# Patient Record
Sex: FEMALE | Race: BLACK OR AFRICAN AMERICAN | NOT HISPANIC OR LATINO
[De-identification: names, ages, dates, MRNs, and addresses within clinical notes are randomized per-mention and may not be internally consistent; named-entity substitution may affect disease eponyms.]

---

## 2018-01-15 ENCOUNTER — RX ONLY (OUTPATIENT)
Age: 43
Setting detail: RX ONLY
End: 2018-01-15

## 2018-02-20 ENCOUNTER — HOSPITAL ENCOUNTER (EMERGENCY)
Dept: HOSPITAL 5 - ED | Age: 43
Discharge: HOME | End: 2018-02-20
Payer: COMMERCIAL

## 2018-02-20 DIAGNOSIS — K21.9: ICD-10-CM

## 2018-02-20 DIAGNOSIS — I10: ICD-10-CM

## 2018-02-20 DIAGNOSIS — M25.571: Primary | ICD-10-CM

## 2018-02-20 DIAGNOSIS — I48.91: ICD-10-CM

## 2018-02-20 PROCEDURE — 96372 THER/PROPH/DIAG INJ SC/IM: CPT

## 2018-02-20 PROCEDURE — 99283 EMERGENCY DEPT VISIT LOW MDM: CPT

## 2018-02-20 PROCEDURE — 36415 COLL VENOUS BLD VENIPUNCTURE: CPT

## 2018-02-20 PROCEDURE — 85379 FIBRIN DEGRADATION QUANT: CPT

## 2018-02-20 PROCEDURE — 73610 X-RAY EXAM OF ANKLE: CPT

## 2018-02-20 NOTE — EMERGENCY DEPARTMENT REPORT
ED Lower Extremity HPI





- General


Chief Complaint: Extremity Injury, Lower


Stated Complaint: BILATERAL LEGS/FEET SWELLING


Time Seen by Provider: 18 19:05


Source: patient, family


Mode of arrival: Ambulatory


Limitations: No Limitations





- History of Present Illness


Initial Comments: 





Patient airport right ankle pain.  She denies injury to said last Tuesday which 

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 

classroom sugar ankle he came more swollen and painful but did not have any 

injury.  She is having pain to her right ankle 8 out of 10 and aching.  Patient 

also has a history of swelling to both legs and reporting pain is radiating up 

in her right leg and she has swelling to both her legs.  Patient has a history 

of atrial fibrillation in the past and also that she had a history of heart 

attack.  She said she has a history of uterine ablation for dysfunctional 

uterine bleeding.  Patient has a history of GERD.  She also reports that she 

was on Lasix in the past as noted on the chart that on 2016 she was 

placed on Lasix for edema in her legs.  Patient says she's been out of her 

metoprolol which she takes twice a day after she had atrial fibrillation and 

heart attack.  Her blood pressure is stable at present.  She denies any chest 

pain, shortness of breath.  Denies any birth control.  Denies any recent long 

distance travel by plane or car to exceed 3 or more hours.  Denies any history 

of cancer or recent convalescent.  She reports that pain is radiating up in her 

right calf and it feels sore but aching into her right ankle.  Over-the-counter 

medication does not help pain.  Pain is worse with weightbearing and better 

with resting.  Denies any fever or chills.  Denies any injuries to her 

extremities.  Denies any redness to legs.  Patient has a history of chronic 

edema to both her lower extremity to include her ankles and feet.


MD Complaint: other


Onset/Timin


-: week(s)


Injury: Ankle: Right (Pain and sweswelling rt ankle)


Type of Injury: unknown


Place: home


Severity: severe


Severity scale (0 -10): 7


Improves With: immobilization, rest


Worsens With: weight bearing, movement, palpation


Context: other (unknown)


Associated Symptoms: swelling, ambulatory.  denies: snap/pop sensation, numbness

, tingling, unable to bear weight, able to partially bear weight





- Related Data


 Previous Rx's











 Medication  Instructions  Recorded  Last Taken  Type


 


ALBUTEROL Inhaler [Proair] 2 puff IH QID PRN #1 inhalation 16 Unknown Rx


 


Furosemide [Lasix] 20 mg PO QDAY #10 tablet 16 Unknown Rx


 


Ibuprofen [Motrin] 800 mg PO Q8HR PRN #12 tablet 18 Unknown Rx


 


Metoprolol [Lopressor TAB] 50 mg PO Q12H 30 Days #60 tablet 18 Unknown Rx











 Allergies











Allergy/AdvReac Type Severity Reaction Status Date / Time


 


No Known Allergies Allergy   Verified 01/01/15 22:03














ED Review of Systems


ROS: 


Stated complaint: BILATERAL LEGS/FEET SWELLING


Other details as noted in HPI





Comment: All other systems reviewed and negative


Constitutional: no symptoms reported


Respiratory: no symptoms reported


Cardiovascular: denies: chest pain, palpitations, dyspnea on exertion, edema, 

syncope, paroxysmal nocturnal dyspnea


Gastrointestinal: denies: abdominal pain, nausea, vomiting


Genitourinary: denies: urgency, dysuria, frequency, hematuria, discharge, 

abnormal menses, dyspareunia


Musculoskeletal: joint swelling, arthralgia.  denies: back pain, myalgia


Neurological: denies: headache, weakness, numbness, paresthesias, confusion, 

abnormal gait, vertigo





ED Past Medical Hx





- Past Medical History


Previous Medical History?: Yes


Hx Hypertension: Yes


Hx GERD: Yes


Hx Renal Disease: No


Hx Asthma: No


Additional medical history: A. Fib.





- Surgical History


Past Surgical History?: Yes


Additional Surgical History: Uterine ablation for DUB 2014





- Family History


Family history: hypertension





- Social History


Smoking Status: Never Smoker


Substance Use Type: Alcohol





- Medications


Home Medications: 


 Home Medications











 Medication  Instructions  Recorded  Confirmed  Last Taken  Type


 


ALBUTEROL Inhaler [Proair] 2 puff IH QID PRN #1 inhalation 16  Unknown Rx


 


Furosemide [Lasix] 20 mg PO QDAY #10 tablet 16  Unknown Rx


 


Ibuprofen [Motrin] 800 mg PO Q8HR PRN #12 tablet 18  Unknown Rx


 


Metoprolol [Lopressor TAB] 50 mg PO Q12H 30 Days #60 tablet 18  Unknown Rx














ED Physical Exam





- General


Limitations: No Limitations


General appearance: alert, in no apparent distress





- Head


Head exam: Present: atraumatic, normocephalic, normal inspection





- Eye


Eye exam: Present: normal appearance, PERRL, EOMI.  Absent: nystagmus, 

periorbital swelling, periorbital tenderness


Pupils: Present: normal accommodation





- ENT


ENT exam: Present: normal exam, normal orophraynx, mucous membranes moist.  

Absent: TM's normal bilaterally, normal external ear exam





- Neck


Neck exam: Present: normal inspection, full ROM.  Absent: tenderness, 

meningismus, lymphadenopathy, thyromegaly





- Respiratory


Respiratory exam: Present: normal lung sounds bilaterally.  Absent: respiratory 

distress, wheezes, chest wall tenderness, accessory muscle use





- Cardiovascular


Cardiovascular Exam: Present: regular rate, normal rhythm, normal heart sounds.

  Absent: systolic murmur, diastolic murmur





- GI/Abdominal


GI/Abdominal exam: Present: soft, normal bowel sounds.  Absent: distended, 

rebound, rigid, mass, bruit, pulsatile mass, hernia





- Extremities Exam


Extremities exam: Present: full ROM, tenderness (right inner ankle), pedal 

edema (right foot minimal), joint swelling (right ankle), other (ambulates with 

mild limp.  The right side).  Absent: normal inspection, normal capillary refill

, calf tenderness





- Expanded Lower Extremity Exam


  ** Right


Hip exam: Present: normal inspection, full ROM, pelvic stability.  Absent: 

tenderness, swelling, abrasion, laceration, ecchymosis, deformity, crepidus, 

dislocation, erythema, external rotation, internal rotation, shortening


Upper Leg exam: Present: normal inspection, full ROM.  Absent: tenderness, 

swelling, abrasion, laceration, ecchymosis, deformity, crepidus, dislocation, 

erythema


Knee exam: Present: normal inspection, full ROM, full knee extension.  Absent: 

tenderness, swelling, abrasion, laceration, ecchymosis, deformity, crepidus, 

dislocation, erythema, effusion, pain w/ pronation/supination, posterior draw 

sign, pain/laxity with valgus, pain/laxity with varus


Lower Leg exam: Present: normal inspection, full ROM, Eitan's sign.  Absent: 

tenderness, swelling, abrasion, laceration, ecchymosis, deformity, crepidus, 

dislocation, erythema, palpable cord


Ankle exam: Present: full ROM, tenderness (right ankle), swelling (mild 

swelling to right  ankle).  Absent: normal inspection, abrasion, laceration, 

ecchymosis, deformity, crepidus, dislocation, erythema


Foot/Toe exam: Present: normal inspection, full ROM, swelling (trace swelling 

to right foot).  Absent: tenderness, abrasion, laceration, ecchymosis, deformity

, crepidus, dislocation, erythema, amputation, puncture wound, foreign body, 

calcaneal tenderness, tenderness at base of 5th metatarsal, nail avulsion, 

subungual hematoma


Neuro vascular tendon exam: Present: no vascular compromise.  Absent: pulse 

deficit, abnormal cap refill, motor deficit, sensory deficit, tendon deficit, 

extremity cold to touch, pallor, abnormal 2-point discrimination, decreased fine

/light touch, foot drop, peroneal nerve deficit, significant pain with passive 

ROM of distal joint


Gait: Positive: observed and limited by pain





- Back Exam


Back exam: Present: normal inspection, full ROM, other (ambulates without any 

difficulties).  Absent: tenderness, CVA tenderness (R), CVA tenderness (L), 

muscle spasm, paraspinal tenderness, vertebral tenderness, rash noted





- Neurological Exam


Neurological exam: Present: alert, oriented X3, abnormal gait (patient with 

minimal limp into right lower extremity), reflexes normal, other (no focal 

deficit).  Absent: motor sensory deficit





- Psychiatric


Psychiatric exam: Present: normal affect, normal mood





- Skin


Skin exam: Present: warm, dry, intact, normal color.  Absent: rash





ED Course


 Vital Signs











  18





  17:21 22:30 22:50


 


Temperature 98.3 F  98.3 F


 


Pulse Rate 88 85 85


 


Respiratory 18  18





Rate   


 


Blood Pressure 134/85 154/87 


 


Blood Pressure   154/87





[Right]   


 


O2 Sat by Pulse 95  99





Oximetry   














- Reevaluation(s)


Reevaluation #1: 





18 21:13


This given Motrin 800 mg emergency room for right ankle pain.  She voiced some 

relief of pain.  Awaiting an x-ray of ankle


1





Reevaluation #2: 





18 22:02


Patient to receive Lovenox 120 mg subcutaneously based on weight.  She will be 

discharged home to follow up outpatient vascular lab for right lower extremity 

vascular study.














Reevaluation #3: 





18 22:24


Patient is stable, metoprolol and Lovenox to be given.  Crutches and Ace wrap 

to stabilize right ankle pain and swelling and please refer to procedure note 

for detail.  Ultrasound, outpatient Doppler right lower extremity for tomorrow 

and patient is aware





- Orthopedic Splinting/Casting


  ** Injury #1


Side: right


Upper Extremity Immobilizer: Ace wrap


Lower Extremity Injury Location: ankle, foot


Other Orthopedic Equipment: crutches


Additional Comments: 





Neurovascular exam normal





ED Lower Extremity MDM





- Lab Data








 Lab Results











  18 Range/Units





  19:38 


 


D-Dimer  249.32 H  (0-234)  ng/mlDDU














- Radiology Data


Radiology results: report reviewed





X-ray of right ankle reveals no soft tissue swelling seen and no fracture or 

malalignment.





- Medical Decision Making





ED course: Patient reports that she has chronic lower extremity edema to 

include her legs, ankle and feet.  She said that she was seen at [facility] for swelling to her extremities and last week she started having 

swelling to her right ankle with pain.  She said that she noticed the pain 

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  She denies twisting, blunt 

trauma or falling.  Patient is not on birth control and she does not have any 

tenderness to palpate to the right calf but she said she has had a history of 

pulmonary embolism after having surgery in  and she was on blood thinner 

which she has not taken now.  Patient reports that she has chronic swelling to 

her legs, ankles and feet though only difference is that she is having pain to 

her right ankle with swelling.  Denies any chest pain or shortness of breath.  

Pain is localized to right ankle.  Physical findings for no swelling to her 

legs and trace swelling to right foot.  She is dependent at the palpate to the 

right ankle.  Patient was given Motrin 800 mg PO  for pain and Lovenox 120 mg 

SQ prophylaxis for DVT in emergency room . Ace wrap and crutches. See procedure 

notes for details.  Her d-dimer is elevated please refer to labs for detail.  X-

ray had no acute findings and please refer to radiology section for details.  I 

discussed the patient her lab results and x-ray results and told her that she'

ll have to have Doppler ultrasound study of her lower extremity to rule out any 

clots.  Patient has a history of heart attack and atrial fibrillation which she 

said that she is on metoprolol and she's been out of her metoprolol for about a 

week and she does not have primary care physician and has not seen a 

cardiologist.  She is that she has not seen a cardiologist in years and she 

does not have any insurance that she doesn't primary care.  I discussed with 

her that I will refer her to [facility] family medicine and she 

can schedule appointment tomorrow for physical exam and this is based on the 

sliding scale fee.  I also discussed with her that is very imperative that she 

return to the vascular lab tomorrow morning to have ultrasound done to rule out 

a blood clot in her leg.  Patient does not have any signs of blood clots to 

legs except she has left ankle swelling and pain at her left ankle.  Her calves 

are normal and she is without any tenderness to her calves.  She does not have 

any erythema or swelling to both her legs. discharged home  in stable condition 

with prescription for Metoprolol and ibuprofen.


Critical care attestation.: 


If time is entered above; I have spent that time in minutes in the direct care 

of this critically ill patient, excluding procedure time.








ED Disposition


Clinical Impression: 


 Pain in right ankle and joints of right foot, Swelling of right ankle joint, D-

dimer, elevated





Disposition: DC-01 TO HOME OR SELFCARE


Is pt being admited?: No


Does the pt Need Aspirin: No


Condition: Stable


Instructions:  Musculoskeletal Pain (ED), Arthralgia (ED)


Additional Instructions: 


A component a lab work are 2S d-dimer was elevated today.  He will need to 

return to the skin lab as directed on appointment sheet to have ultrasound of 

your lower extremity to rule out any blood clots.  Your given little dots which 

is a blood thinner to protect you and keeping her blood thin and prevent 

clotting.


It is very imperative that you return tomorrow to get this Doppler ultrasound 

study done.  If the vascular lab at [facility] does not 

call you by 9 AM he will need to call them and let them know that you're seen 

in the emergency room and ultrasound of your right lower extremity was ordered.

  They will have appointment.


If he develops any chest pain or shortness of breath please return to the 

emergency room ASAP.


No weightbearing to right lower extremity.


If you're ultrasound comes back positive for blood clot, staff in vascular lab 

well direct you back to the emergency room.


Prescriptions: 


Ibuprofen [Motrin] 800 mg PO Q8HR PRN #12 tablet


 PRN Reason: Pain , Severe (7-10)


Metoprolol [Lopressor TAB] 50 mg PO Q12H 30 Days #60 tablet


Referrals: 


[facility] [Outside] - 18 (These call [facility] for management of chronic medical problem and they will refer you 

accordingly.  You need to have a Pap smear and also mammogram at the age of 43 

which she has told me you haven't had in 3 years)


[facility], vascular lab [Other] - 3-5 Days (Please proceed 

to the vascular lab at [facility] on 2018 for 

appointment for Doppler ultrasound of right lower extremity to rule out any 

blood clot.  If you do not get a call from them by 9 AM please call them and 

let them know that you're scheduled to be seen to have ultrasound to your right 

lower leg per emergency room.)

## 2018-02-20 NOTE — XRAY REPORT
FINAL REPORT



PROCEDURE:  XR ANKLE 3+V RT



TECHNIQUE:  Right ankle radiographs, AP, lateral, and oblique

views. CPT 79261



HISTORY:  rt ankle pain and swelling 



COMPARISON:  No prior studies are available for comparison.



FINDINGS:  

Fracture (s) and/or Dislocation(s): None .



Alignment: Normal .



Joint space(s): Normal .



Soft tissues: Normal .



Bone mineralization: Normal .



Foreign bodies: None .



Calcaneal spurring: There are calcaneal spurs..







IMPRESSION:  

There are no fractures or malalignments. Soft tissues are

unremarkable..

## 2018-02-20 NOTE — EMERGENCY DEPARTMENT REPORT
Blank Doc





- Documentation


Documentation: 





Patient is a 43-year-old  female who is presenting with right 

ankle pain this started to radiate up the right calf.  Patient states that she 

has a history of some dependent edema however the edema has not been in bed she 

does have point discomfort.  Patient states last week she did have some 

increased edema but she's been elevating the leg pain is not improved.  D-dimer 

will be ordered to rule out potential for clotting disorder.

## 2018-02-21 ENCOUNTER — HOSPITAL ENCOUNTER (OUTPATIENT)
Dept: HOSPITAL 5 - VAS | Age: 43
Discharge: HOME | End: 2018-02-21
Attending: NURSE PRACTITIONER
Payer: COMMERCIAL

## 2018-02-21 VITALS — SYSTOLIC BLOOD PRESSURE: 154 MMHG | DIASTOLIC BLOOD PRESSURE: 87 MMHG

## 2018-02-21 DIAGNOSIS — M25.471: ICD-10-CM

## 2018-02-21 DIAGNOSIS — M25.571: Primary | ICD-10-CM

## 2018-03-12 ENCOUNTER — HOSPITAL ENCOUNTER (EMERGENCY)
Dept: HOSPITAL 5 - ED | Age: 43
Discharge: HOME | End: 2018-03-12
Payer: COMMERCIAL

## 2018-03-12 VITALS — SYSTOLIC BLOOD PRESSURE: 154 MMHG | DIASTOLIC BLOOD PRESSURE: 97 MMHG

## 2018-03-12 DIAGNOSIS — J02.9: Primary | ICD-10-CM

## 2018-03-12 DIAGNOSIS — I10: ICD-10-CM

## 2018-03-12 DIAGNOSIS — R59.9: ICD-10-CM

## 2018-03-12 DIAGNOSIS — K21.9: ICD-10-CM

## 2018-03-12 DIAGNOSIS — R51: ICD-10-CM

## 2018-03-12 PROCEDURE — 99282 EMERGENCY DEPT VISIT SF MDM: CPT

## 2018-03-12 NOTE — EMERGENCY DEPARTMENT REPORT
Minor Respiratory





- HPI


Chief Complaint: Sore Throat


Stated Complaint: FEVER, SORE THROAT


Time Seen by Provider: 03/12/18 11:44


Duration: 4 Days (sore throats and pain to right ear.)


Pain Location: Throat, Ear


Severity: severe (10/10.  Sore worse with swallowing.  Over-the-counter pain 

medication without any relief)


Minor Respiratory: Yes Sore Throat, Yes Able to Tolerate Fluids, Yes Ear Pain (

right ear), Yes Fever, No Rhinorrhea, No Cough, No Sick Contacts, No Hemoptysis

, No Chest Pain, No Shortness of Breath


Other History: Patient reports that she is sore throat fever and sore neck 4 

days.  She's been taking over-the-counter medication without any relief.  

Denies any nausea or vomiting.  She also reports that she has headache on and 

off for the past 4 days.  Denies any abdominal pain.  Denies any back pain.  

Denies any cough, congestion, shortness of breath or chest pain.  Pain feels 

achy , no alleviating factor, worse with swallowing.  Denies any drooling.





ED Review of Systems


ROS: 


Stated complaint: FEVER, SORE THROAT


Other details as noted in HPI





Comment: All other systems reviewed and negative


Constitutional: no symptoms reported


Eyes: denies: eye pain, eye discharge


ENT: ear pain, throat pain.  denies: dental pain, hearing loss, congestion


Respiratory: no symptoms reported


Cardiovascular: denies: chest pain, palpitations, dyspnea on exertion, edema, 

syncope, paroxysmal nocturnal dyspnea


Gastrointestinal: denies: abdominal pain, nausea, vomiting, diarrhea, 

constipation, hematemesis, melena, hematochezia


Genitourinary: denies: urgency, dysuria, frequency, hematuria, discharge, 

abnormal menses


Musculoskeletal: denies: back pain, joint swelling, arthralgia, myalgia


Skin: denies: rash


Neurological: headache.  denies: weakness, numbness, paresthesias, confusion, 

abnormal gait, vertigo





ED Past Medical Hx





- Past Medical History


Previous Medical History?: Yes


Hx Hypertension: Yes


Hx GERD: Yes


Hx Renal Disease: No


Hx Asthma: No


Additional medical history: A. Fib.





- Surgical History


Past Surgical History?: Yes


Additional Surgical History: Uterine ablation for DUB November 2014





- Family History


Family history: hypertension





- Social History


Smoking Status: Never Smoker


Substance Use Type: Alcohol, Other





- Medications


Home Medications: 


 Home Medications











 Medication  Instructions  Recorded  Confirmed  Last Taken  Type


 


ALBUTEROL Inhaler [Proair] 2 puff IH QID PRN #1 inhalation 03/20/16  Unknown Rx


 


Furosemide [Lasix] 20 mg PO QDAY #10 tablet 03/20/16  Unknown Rx


 


Metoprolol [Lopressor TAB] 50 mg PO Q12H 30 Days #60 tablet 02/20/18  Unknown Rx


 


Ibuprofen [Motrin 800 MG tab] 800 mg PO Q8HR PRN #12 tablet 03/12/18  Unknown Rx


 


Penicillin V Potassium 500 mg PO Q8H 10 Days #30 tablet 03/12/18  Unknown Rx














Minor Respiratory Exam





- Exam


General: 


Vital signs noted. No distress. Alert and acting appropriately.


This is a 43-year-old female well-nourished well-developed in no acute distress.


HEENT: Yes Pharyngeal Erythema, Yes Pharyngeal Exudates, Yes Moist Mucous 

Membranes (uvula is midline and oral airways patent.), No Rhinorrhea, No 

Conjuctival Injection, No Frontal Tenderness, No Maxillary Tenderness


Ear: Neither TM Bulge, Neither TM Erythema, Neither EAC Pain, Neither EAC 

Discharge


Neck: Yes Adenopathy (bilateral anterior chain), Yes Supple (full range of 

motion)


Lungs: Yes Good Air Exchange, No Wheezes, No Ronchi, No Stridor, No Cough, No 

Labored Respirations, No Retractions, No Use of Accessory Muscles, No Other 

Abnormal Lung Sounds


Heart: Yes Regular (S1, S2), No Murmur


Abdomen: Yes Normal Bowel Sounds (in all quadrants.), No Tenderness (nontender 

the palpation in all quadrants and no CVA tenderness), No Peritoneal Signs


Skin: No Rash, No Edema


Neurologic: 


Alert and oriented, no deficits.





Neurological: Patient is alert and oriented 3, GCS of 15.  Speech is clear.  

Normal gait, no motor or sensory deficit.  Normal reflexes.


Musculoskeletal: 


Unremarkable.





Normal exam





ED Course


 Vital Signs











  03/12/18





  10:55


 


Temperature 98.8 F


 


Pulse Rate 93 H


 


Respiratory 20





Rate 


 


Blood Pressure 154/97


 


O2 Sat by Pulse 96





Oximetry 














- Reevaluation(s)


Reevaluation #1: 





03/12/18 12:56


Patient received Motrin 800 mg by mouth and Deltasone 60 mg by mouth.





ED Medical Decision Making





- Medical Decision Making





ED course: Patient presented with sore throat, fever and headache with neck 

pain on and off for the past 4 days.  Physical findings for enlarged anterior 

cervical chain lymph nodes, exudative pharyngitis, patient has no respiratory 

symptoms and she is having headache with fever.  Based on [facility] criteria, 

patient with exudative pharyngitis and no need for strep testing.  I discussed 

patient diagnosis and treatment plan and she is in agreement.  She received 

both of those this milligrams by mouth, Motrin 800 mg in the emergency room for 

relief of pain.  Patient discharged home prescription for Motrin and penicillin 

V and to follow up with her primary care in 2-3 days.


Critical care attestation.: 


If time is entered above; I have spent that time in minutes in the direct care 

of this critically ill patient, excluding procedure time.








ED Disposition


Clinical Impression: 


 Exudative pharyngitis, Anterior cervical adenopathy





Acute headache


Qualifiers:


 Headache type: unspecified Intractability: not intractable Qualified Code(s): 

R51 - Headache





Disposition: DC-01 TO HOME OR SELFCARE


Is pt being admited?: No


Does the pt Need Aspirin: No


Condition: Stable


Instructions:  Strep Throat (ED)


Additional Instructions: 


Please increase her fluid intake


Return to work in 48 hours


Practice good hand hygiene


Take antibiotic and Motrin as prescribed.


Motrin is for fever and pain


Prescriptions: 


Ibuprofen [Motrin 800 MG tab] 800 mg PO Q8HR PRN #12 tablet


 PRN Reason: fever and/or pain


Penicillin V Potassium 500 mg PO Q8H 10 Days #30 tablet


Referrals: 


[facility] [Outside] - 2-3 Days


your, primary care physician [Other] - 3-5 Days


Forms:  Work/School Release Form(ED)

## 2019-05-15 ENCOUNTER — RX ONLY (OUTPATIENT)
Age: 44
Setting detail: RX ONLY
End: 2019-05-15

## 2019-10-26 ENCOUNTER — HOSPITAL ENCOUNTER (EMERGENCY)
Dept: HOSPITAL 5 - ED | Age: 44
Discharge: HOME | End: 2019-10-26
Payer: MEDICAID

## 2019-10-26 VITALS — DIASTOLIC BLOOD PRESSURE: 94 MMHG | SYSTOLIC BLOOD PRESSURE: 128 MMHG

## 2019-10-26 DIAGNOSIS — I10: ICD-10-CM

## 2019-10-26 DIAGNOSIS — E86.0: ICD-10-CM

## 2019-10-26 DIAGNOSIS — R42: ICD-10-CM

## 2019-10-26 DIAGNOSIS — R06.02: Primary | ICD-10-CM

## 2019-10-26 DIAGNOSIS — I48.91: ICD-10-CM

## 2019-10-26 DIAGNOSIS — Z79.899: ICD-10-CM

## 2019-10-26 DIAGNOSIS — K21.9: ICD-10-CM

## 2019-10-26 LAB
ALBUMIN SERPL-MCNC: 4 G/DL (ref 3.9–5)
ALT SERPL-CCNC: 18 UNITS/L (ref 7–56)
BASOPHILS # (AUTO): 0 K/MM3 (ref 0–0.1)
BASOPHILS NFR BLD AUTO: 0.1 % (ref 0–1.8)
BUN SERPL-MCNC: 18 MG/DL (ref 7–17)
BUN/CREAT SERPL: 18 %
CALCIUM SERPL-MCNC: 9.5 MG/DL (ref 8.4–10.2)
EOSINOPHIL # BLD AUTO: 0.1 K/MM3 (ref 0–0.4)
EOSINOPHIL NFR BLD AUTO: 0.9 % (ref 0–4.3)
HCT VFR BLD CALC: 39 % (ref 30.3–42.9)
HEMOLYSIS INDEX: 23
HGB BLD-MCNC: 13.1 GM/DL (ref 10.1–14.3)
INR PPP: 1.01 (ref 0.87–1.13)
LYMPHOCYTES # BLD AUTO: 2.1 K/MM3 (ref 1.2–5.4)
LYMPHOCYTES NFR BLD AUTO: 23.8 % (ref 13.4–35)
MCHC RBC AUTO-ENTMCNC: 34 % (ref 30–34)
MCV RBC AUTO: 92 FL (ref 79–97)
MONOCYTES # (AUTO): 1 K/MM3 (ref 0–0.8)
MONOCYTES % (AUTO): 11.2 % (ref 0–7.3)
PLATELET # BLD: 247 K/MM3 (ref 140–440)
RBC # BLD AUTO: 4.25 M/MM3 (ref 3.65–5.03)

## 2019-10-26 PROCEDURE — 85025 COMPLETE CBC W/AUTO DIFF WBC: CPT

## 2019-10-26 PROCEDURE — 84703 CHORIONIC GONADOTROPIN ASSAY: CPT

## 2019-10-26 PROCEDURE — 80053 COMPREHEN METABOLIC PANEL: CPT

## 2019-10-26 PROCEDURE — 93010 ELECTROCARDIOGRAM REPORT: CPT

## 2019-10-26 PROCEDURE — 93005 ELECTROCARDIOGRAM TRACING: CPT

## 2019-10-26 PROCEDURE — 71046 X-RAY EXAM CHEST 2 VIEWS: CPT

## 2019-10-26 PROCEDURE — 85610 PROTHROMBIN TIME: CPT

## 2019-10-26 PROCEDURE — 36415 COLL VENOUS BLD VENIPUNCTURE: CPT

## 2019-10-26 PROCEDURE — 84484 ASSAY OF TROPONIN QUANT: CPT

## 2019-10-26 NOTE — EVENT NOTE
ED Screening Note


Date of service: 10/26/19


Time: 17:07


ED Screening Note: 





Reports dizziness, jittery, SOB, Heart palpatation a few hours ago but feels 

better now. Taking metoprolol, Lasix, Nifedipine,Chlorthalddane. Dr Key is 

Cardiologist, PCP alphonse is PCP. Takes Potassium. Denies swelling to extremities





Placed on Holter monitor for 2 weeks 2 weeks ago and nothing was found. Had  

ECHO 4 months ago. No h/o stress test


VSS


Tachycardia





This initial assessment/diagnostic orders/clinical plan/treatment(s) is/are 

subject to change based on patients health status, clinical progression and re-

assessment by fellow clinical providers in the ED. Further treatment and workup 

at subsequent clinical providers discretion. Patient/guardian urged not to elope

from the ED as their condition may be serious if not clinically assessed and 

managed. 





Initial orders include: CP protocol

## 2019-10-26 NOTE — XRAY REPORT
CHEST 2 VIEWS 



INDICATION: 

Lightheadedness/Dizziness.



COMPARISON: 

Chest x-ray from 3/19/2016



FINDINGS:

Support devices: None.



Heart: Within normal limits. 

Lungs/pleura: No acute air space or interstitial disease.  No pneumothorax.



Additional findings: None.



IMPRESSION:

1. No acute findings.



Signer Name: Chace Funes MD 

Signed: 10/26/2019 7:24 PM

 Workstation Name: [id number]

## 2019-10-26 NOTE — EMERGENCY DEPARTMENT REPORT
ED General Adult HPI





- General


Chief complaint: Dizziness


Stated complaint: DIZZINESS,SHORTNESS OF BREATH,CHEST PALP


Time Seen by Provider: 10/26/19 18:25


Source: patient


Mode of arrival: Ambulatory


Limitations: No Limitations, Altered Mental Status





- History of Present Illness


Initial comments: 





Patient is a 44-year-old female that presents emergency room with complaints of 

shortness of breath and dizziness that going on for 2 weeks.  Patient states his

symptoms are intermittent.  Patient states the symptoms are not worsening but of

the same.  Patient states her symptoms completely come and go.  Patient states 

her shortness of breath is worse with exertion and better with rest.  Patient 

states her dizziness is worse with exertion and better with rest.  Patient 

states her dizziness is more lightheadedness.  Patient denies a sensation of 

movement.  Patient states she has palpitations at times.  Patient denies chest 

pain.  Patient denies fever.  Patient denies chills.  Patient denies near 

syncope.  Patient denies syncope.  Patient states she has not seen her 

cardiologist or her primary care further symptoms.  She states she's not having 

any symptoms at this time but just wanted to be checked out since the symptoms 

have been going on for 2 weeks.


-: Sudden


Severity scale (0 -10): 0


Consistency: intermittent, now resolved


Improves with: rest


Worsens with: other


Associated Symptoms: shortness of breath.  denies: confusion, chest pain, cough,

diaphoresis, fever/chills, headaches, loss of appetite, malaise, 

nausea/vomiting, rash, seizure, syncope, weakness


Treatments Prior to Arrival: none





- Related Data


                                  Previous Rx's











 Medication  Instructions  Recorded  Last Taken  Type


 


ALBUTEROL Inhaler (OR & NICU) 2 puff IH QID PRN #1 inhalation 03/20/16 Unknown 

Rx





[Proair]    


 


Furosemide [Lasix] 20 mg PO QDAY #10 tablet 03/20/16 Unknown Rx


 


Metoprolol [Lopressor TAB] 50 mg PO Q12H 30 Days #60 tablet 02/20/18 Unknown Rx


 


Ibuprofen [Motrin 800 MG tab] 800 mg PO Q8HR PRN #12 tablet 03/12/18 Unknown Rx


 


Penicillin V Potassium 500 mg PO Q8H 10 Days #30 tablet 03/12/18 Unknown Rx











                                    Allergies











Allergy/AdvReac Type Severity Reaction Status Date / Time


 


No Known Allergies Allergy   Verified 01/01/15 22:03














ED Review of Systems


ROS: 


Stated complaint: DIZZINESS,SHORTNESS OF BREATH,CHEST PALP


Other details as noted in HPI





Comment: All other systems reviewed and negative


Constitutional: denies: chills, fever


Eyes: denies: eye pain, eye discharge, vision change


ENT: denies: ear pain, throat pain


Respiratory: shortness of breath.  denies: cough, wheezing


Cardiovascular: palpitations.  denies: chest pain


Endocrine: no symptoms reported


Gastrointestinal: denies: abdominal pain, nausea, diarrhea


Genitourinary: denies: urgency, dysuria, discharge


Musculoskeletal: denies: back pain, joint swelling, arthralgia


Skin: denies: rash, lesions


Neurological: denies: headache, weakness, paresthesias


Psychiatric: denies: anxiety, depression


Hematological/Lymphatic: denies: easy bleeding, easy bruising





ED Past Medical Hx





- Past Medical History


Previous Medical History?: Yes


Hx Hypertension: Yes


Hx GERD: Yes


Hx Renal Disease: No


Hx Asthma: No


Additional medical history: A. Fib.





- Surgical History


Past Surgical History?: Yes


Additional Surgical History: Uterine ablation for DUB November 2014





- Social History


Smoking Status: Never Smoker


Substance Use Type: Prescribed





- Medications


Home Medications: 


                                Home Medications











 Medication  Instructions  Recorded  Confirmed  Last Taken  Type


 


ALBUTEROL Inhaler (OR & NICU) 2 puff IH QID PRN #1 inhalation 03/20/16  Unknown 

Rx





[Proair]     


 


Furosemide [Lasix] 20 mg PO QDAY #10 tablet 03/20/16  Unknown Rx


 


Metoprolol [Lopressor TAB] 50 mg PO Q12H 30 Days #60 tablet 02/20/18  Unknown Rx


 


Ibuprofen [Motrin 800 MG tab] 800 mg PO Q8HR PRN #12 tablet 03/12/18  Unknown Rx


 


Penicillin V Potassium 500 mg PO Q8H 10 Days #30 tablet 03/12/18  Unknown Rx














ED Physical Exam





- General


Limitations: No Limitations


General appearance: alert, in no apparent distress





- Head


Head exam: Present: atraumatic, normocephalic





- Eye


Eye exam: Present: normal appearance, PERRL


Pupils: Present: normal accommodation





- ENT


ENT exam: Present: mucous membranes moist





- Neck


Neck exam: Present: normal inspection, full ROM.  Absent: tenderness, 

meningismus, lymphadenopathy, thyromegaly





- Respiratory


Respiratory exam: Present: normal lung sounds bilaterally.  Absent: respiratory 

distress, wheezes, rales





- Cardiovascular


Cardiovascular Exam: Present: regular rate, normal rhythm.  Absent: systolic 

murmur, diastolic murmur, rubs, gallop





- GI/Abdominal


GI/Abdominal exam: Present: soft, normal bowel sounds.  Absent: distended, 

tenderness, guarding





- Rectal


Rectal exam: Present: deferred





- Extremities Exam


Extremities exam: Present: normal inspection, full ROM, normal capillary refill.

 Absent: tenderness, pedal edema, calf tenderness





- Back Exam


Back exam: Present: normal inspection, full ROM





- Neurological Exam


Neurological exam: Present: alert, oriented X3





- Psychiatric


Psychiatric exam: Present: normal affect, normal mood





- Skin


Skin exam: Present: warm, dry, intact, normal color.  Absent: rash





ED Course


                                   Vital Signs











  10/26/19 10/26/19 10/26/19





  16:58 17:42 17:54


 


Temperature 98 F  


 


Pulse Rate 110 H 85 


 


Respiratory 22 16 14





Rate   


 


Blood Pressure 151/86  


 


Blood Pressure  124/78 





[Left]   


 


O2 Sat by Pulse 97 100 100





Oximetry   














  10/26/19





  19:21


 


Temperature 


 


Pulse Rate 82


 


Respiratory 20





Rate 


 


Blood Pressure 


 


Blood Pressure 128/94





[Left] 


 


O2 Sat by Pulse 97





Oximetry 














- Reevaluation(s)


Reevaluation #1: 


Patient states she is not having any symptoms at this time.  Patient states she 

is able to ambulate to the bathroom and back without difficulties.  Patient 

tolerated water intake.





I discussed all results with patient.  I discussed plan of care with patient.  

Patient is stable for discharge.  Patient will be discharged home.  Patient 

given discharge instructions.  Patient voiced understanding of discharge 

instructions.


10/26/19 19:35





10/26/19 19:37








- Consultations


Consultation #1: 


I discussed case with the physician on-call for [facility].  Dr. Rodriguez 

states she's going to set the patient up with an appointment on Monday. 


10/26/19 20:01








ED Medical Decision Making





- Lab Data


Result diagrams: 


                                 10/26/19 17:42





                                 10/26/19 17:42





- EKG Data


-: EKG Interpreted by Me


EKG shows normal: sinus rhythm, axis, intervals, QRS complexes, ST-T waves


Rate: tachycardia





- Radiology Data


Radiology results: report reviewed, image reviewed


interpreted by me: 


No acute findings on chest x-ray.








- Medical Decision Making





Patient is a 44-year-old female that presents emergency room for shortness of 

breath and dizziness.  Patient's symptoms going on for 2 weeks.  Patient's 

symptoms are intermittent.  Patient denies having symptoms while in the ER.  

Patient from a emergency medical standpoint is stable for discharge.  Patient 

discharged home.  Patient instructed to follow-up with her cardiologist within 

2-3 days.  Patient's labs unremarkable.  Patient's EKG shows a sinus tach.  On 

cardiac monitor patient shows a sinus rhythm of a rate of 80-84.  Patient's 

blood pressure better.  Vital signs reviewed.  Chest x-ray reviewed and 

negative.





- Differential Diagnosis


shortness of breath.  Dizziness.  Dehydration.


Critical care attestation.: 


If time is entered above; I have spent that time in minutes in the direct care 

of this critically ill patient, excluding procedure time.








ED Disposition


Clinical Impression: 


 SOB (shortness of breath), Dizziness, Dehydration





Hypertension


Qualifiers:


 Hypertension type: essential hypertension Qualified Code(s): I10 - Essential 

(primary) hypertension





Disposition: DC-01 TO HOME OR SELFCARE


Is pt being admited?: No


Does the pt Need Aspirin: No


Condition: Stable


Instructions:  Heart Healthy Diet (ED), How to Take a Blood Pressure  (ED), DASH

 Eating Plan (ED), Low Sodium Diet (ED), Hypertension (ED), Lightheadedness 

(ED), Dizziness (ED)


Additional Instructions: 


Patient to follow-up with primary care in 2-3 days.  Patient to follow-up with 

cardiologist within 2 days.  Patient has an appointment set up for Monday with 

Dr. Potter.  Patient to return to ER if condition worsens.  Patient increase 

water.  Patient to rest.  Patient to avoid strenuous exercise until she is seen 

by her primary care and cardiologist.  Patient to meds.  Patient to eat heart 

healthy and low-salt diet.


Referrals: 


LEONARD BRADSHAW MD [Primary Care Provider] - 2-3 Days


BUTCH POTTER MD [Staff Physician] - 2-3 Days


Time of Disposition: 19:59

## 2020-01-28 ENCOUNTER — HOSPITAL ENCOUNTER (EMERGENCY)
Dept: HOSPITAL 5 - ED | Age: 45
Discharge: HOME | End: 2020-01-28
Payer: MEDICAID

## 2020-01-28 VITALS — DIASTOLIC BLOOD PRESSURE: 86 MMHG | SYSTOLIC BLOOD PRESSURE: 150 MMHG
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
## 2020-01-28 DIAGNOSIS — K21.9: ICD-10-CM
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
## 2020-01-28 DIAGNOSIS — S89.91XA: Primary | ICD-10-CM
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
## 2020-01-28 DIAGNOSIS — Z79.899: ICD-10-CM

## 2020-01-28 DIAGNOSIS — Z88.1: ICD-10-CM
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
## 2020-01-28 DIAGNOSIS — Z90.49: ICD-10-CM

## 2020-01-28 NOTE — EMERGENCY DEPARTMENT REPORT
ED General Adult HPI





- General


Chief complaint: Extremity Injury, Lower


Stated complaint: RT KNEE INJURY


Time Seen by Provider: 01/28/20 11:11


Source: patient


Mode of arrival: Ambulatory


Limitations: No Limitations





- History of Present Illness


Initial comments: 





The patient presents to the emergency department with a chief complaint of right

knee pain.  Patient states she fell at a gas station striking her right knee 

against the ground.  Sensation states since that time she's had right knee pain 

with some swelling.  Patient states the Tylenol and Motrin at home are not 

working for relief of pain.  She states she has a history of multiple knee 

injuries to the right knee.  Patient denies hitting her head or any loss 

consciousness.


-: Sudden


Location: lower extremity


Radiation: non-radiation


Severity scale (0 -10): 6


Quality: sharp


Consistency: constant


Improves with: rest


Worsens with: movement


Associated Symptoms: denies other symptoms


Treatments Prior to Arrival: none





- Related Data


                                  Previous Rx's











 Medication  Instructions  Recorded  Last Taken  Type


 


Albuterol INH(or & Nicu Only) 2 puff IH QID PRN #1 inhalation 03/20/16 Unknown 

Rx





[Proair]    


 


Furosemide [Lasix] 20 mg PO QDAY #10 tablet 03/20/16 Unknown Rx


 


Metoprolol [Lopressor TAB] 50 mg PO Q12H 30 Days #60 tablet 02/20/18 Unknown Rx


 


Ibuprofen [Motrin 800 MG tab] 800 mg PO Q8HR PRN #12 tablet 03/12/18 Unknown Rx


 


Penicillin V Potassium 500 mg PO Q8H 10 Days #30 tablet 03/12/18 Unknown Rx


 


Acetaminophen/Codeine [Tylenol 1 tab PO Q6H PRN #15 tab 01/28/20 Unknown Rx





/Codeine # 3 tab]    


 


Ondansetron [Zofran Odt] 4 mg PO Q4HR PRN #20 tab.rapdis 01/28/20 Unknown Rx


 


traMADoL [Ultram] 50 mg PO Q6HR PRN #20 tablet 01/28/20 Unknown Rx











                                    Allergies











Allergy/AdvReac Type Severity Reaction Status Date / Time


 


azithromycin [From Zithromax] Allergy  Hives Verified 01/28/20 09:32














ED Review of Systems


ROS: 


Stated complaint: RT KNEE INJURY


Other details as noted in HPI





Comment: All other systems reviewed and negative


Constitutional: denies: chills, fever


Eyes: denies: eye pain, eye discharge, vision change


ENT: denies: ear pain, throat pain


Respiratory: denies: cough, shortness of breath, wheezing


Cardiovascular: denies: chest pain, palpitations


Endocrine: no symptoms reported


Gastrointestinal: denies: abdominal pain, nausea, diarrhea


Genitourinary: denies: urgency, dysuria, discharge


Musculoskeletal: denies: back pain, joint swelling, arthralgia


Skin: denies: rash, lesions


Neurological: denies: headache, weakness, paresthesias


Psychiatric: denies: anxiety, depression


Hematological/Lymphatic: denies: easy bleeding, easy bruising





ED Past Medical Hx





- Past Medical History


Previous Medical History?: Yes


Hx Hypertension: No


Hx GERD: Yes


Hx Renal Disease: No


Hx Asthma: No


Hx Tuberculosis: No


Additional medical history: A. Fib.CHF. DEPENENT EDEMA





- Surgical History


Past Surgical History?: Yes


Hx Coronary Stent: No


Hx Open Heart Surgery: No


Hx Pacemaker: No


Hx Internal Defibrillator: No


Hx Cholecystectomy: Yes


Hx Appendectomy: No


Hx Breast Surgery: No


Additional Surgical History: Uterine ablation for DUB November 2014





- Social History


Smoking Status: Never Smoker


Substance Use Type: Prescribed





- Medications


Home Medications: 


                                Home Medications











 Medication  Instructions  Recorded  Confirmed  Last Taken  Type


 


Albuterol INH(or & Nicu Only) 2 puff IH QID PRN #1 inhalation 03/20/16  Unknown 

Rx





[Proair]     


 


Furosemide [Lasix] 20 mg PO QDAY #10 tablet 03/20/16  Unknown Rx


 


Metoprolol [Lopressor TAB] 50 mg PO Q12H 30 Days #60 tablet 02/20/18  Unknown Rx


 


Ibuprofen [Motrin 800 MG tab] 800 mg PO Q8HR PRN #12 tablet 03/12/18  Unknown Rx


 


Penicillin V Potassium 500 mg PO Q8H 10 Days #30 tablet 03/12/18  Unknown Rx


 


Acetaminophen/Codeine [Tylenol 1 tab PO Q6H PRN #15 tab 01/28/20  Unknown Rx





/Codeine # 3 tab]     


 


Ondansetron [Zofran Odt] 4 mg PO Q4HR PRN #20 tab.rapdis 01/28/20  Unknown Rx


 


traMADoL [Ultram] 50 mg PO Q6HR PRN #20 tablet 01/28/20  Unknown Rx














ED Physical Exam





- General


Limitations: No Limitations


General appearance: alert, in no apparent distress





- Head


Head exam: Present: atraumatic, normocephalic





- Eye


Eye exam: Present: normal appearance





- ENT


ENT exam: Present: mucous membranes moist





- Neck


Neck exam: Present: normal inspection





- Respiratory


Respiratory exam: Present: normal lung sounds bilaterally.  Absent: respiratory 

distress





- Cardiovascular


Cardiovascular Exam: Present: regular rate, normal rhythm.  Absent: systolic 

murmur, diastolic murmur, rubs, gallop





- GI/Abdominal


GI/Abdominal exam: Present: soft, normal bowel sounds





- Extremities Exam


Extremities exam: Present: other (patient has tenderness palpation over the righ

t patella with a small knee effusion; negative anterior and posterior drawer 

tests)





- Back Exam


Back exam: Present: normal inspection





- Neurological Exam


Neurological exam: Present: alert, oriented X3, CN II-XII intact.  Absent: motor

 sensory deficit





- Psychiatric


Psychiatric exam: Present: normal affect, normal mood





- Skin


Skin exam: Present: warm, dry, intact, normal color.  Absent: rash





ED Course





                                   Vital Signs











  01/28/20





  09:22


 


Temperature 98.4 F


 


Pulse Rate 90


 


Respiratory 16





Rate 


 


Blood Pressure 150/86


 


O2 Sat by Pulse 96





Oximetry 














ED Medical Decision Making





- Medical Decision Making





Discussed imaging with patient who politely declined


Critical care attestation.: 


If time is entered above; I have spent that time in minutes in the direct care 

of this critically ill patient, excluding procedure time.








ED Disposition


Clinical Impression: 


 Knee pain, right, Knee injury





Disposition: DC-01 TO HOME OR SELFCARE


Is pt being admited?: No


Does the pt Need Aspirin: No


Condition: Stable


Instructions:  Knee Pain (ED)


Additional Instructions: 


return if worse


Referrals: 


PRIMARY CARE,MD [Primary Care Provider] - 3-5 Days


[facility] [Provider Group] - 3-5 Days


[facility] [Provider Group] - 3-5 Days


Time of Disposition: 12:31

## 2020-03-05 ENCOUNTER — HOSPITAL ENCOUNTER (EMERGENCY)
Dept: HOSPITAL 5 - ED | Age: 45
Discharge: HOME | End: 2020-03-05
Payer: MEDICAID

## 2020-03-05 VITALS — DIASTOLIC BLOOD PRESSURE: 88 MMHG | SYSTOLIC BLOOD PRESSURE: 119 MMHG

## 2020-03-05 DIAGNOSIS — Z88.1: ICD-10-CM

## 2020-03-05 DIAGNOSIS — Z98.890: ICD-10-CM

## 2020-03-05 DIAGNOSIS — I50.9: ICD-10-CM

## 2020-03-05 DIAGNOSIS — I48.91: ICD-10-CM

## 2020-03-05 DIAGNOSIS — M23.91: Primary | ICD-10-CM

## 2020-03-05 DIAGNOSIS — K21.9: ICD-10-CM

## 2020-03-05 DIAGNOSIS — Z79.899: ICD-10-CM

## 2020-03-05 DIAGNOSIS — Z90.49: ICD-10-CM

## 2020-03-05 PROCEDURE — 99282 EMERGENCY DEPT VISIT SF MDM: CPT

## 2020-03-05 NOTE — EMERGENCY DEPARTMENT REPORT
ED Lower Extremity HPI





- General


Chief Complaint: Extremity Injury, Lower


Stated Complaint: KNEE PAIN


Time Seen by Provider: 03/05/20 17:32


Source: patient


Mode of arrival: Ambulatory


Limitations: Physical Limitation





- History of Present Illness


Initial Comments: 


Ms. Austin is a 45-year-old female that presents with right knee knee. 3 weeks 

ago.  Had xray and was diagnosed with right knee fracture.  Xrays present in 

triage today.   requesting referrral to orthopedics.  Xray results noted for 

questionable for linear lucency in medial tibial condyle , pt is ambulatory in 

ed. requesting referral to Ortho. Will rx knee immobilizer and crutches, and 

ortho referral, pt is currently a/o x 3, ambulatory, with nad , huy 3/10 knee 

pain for past 3 weeks. She denies new fall injury or trauma.








MD Complaint: knee injury


Onset/Timing: 3


-: week(s)


Injury: Knee: Right


Type of Injury: blunt


Place: street/outdoors


Severity: moderate


Severity scale (0 -10): 5


Improves With: nothing


Worsens With: weight bearing, palpation


Context: fall


Associated Symptoms: swelling, ambulatory





- Related Data


                                  Previous Rx's











 Medication  Instructions  Recorded  Last Taken  Type


 


Albuterol INH(or & Nicu Only) 2 puff IH QID PRN #1 inhalation 03/20/16 Unknown 

Rx





[Proair]    


 


Furosemide [Lasix] 20 mg PO QDAY #10 tablet 03/20/16 Unknown Rx


 


Metoprolol [Lopressor TAB] 50 mg PO Q12H 30 Days #60 tablet 02/20/18 Unknown Rx


 


Ibuprofen [Motrin 800 MG tab] 800 mg PO Q8HR PRN #12 tablet 03/12/18 Unknown Rx


 


Penicillin V Potassium 500 mg PO Q8H 10 Days #30 tablet 03/12/18 Unknown Rx


 


Acetaminophen/Codeine [Tylenol 1 tab PO Q6H PRN #15 tab 01/28/20 Unknown Rx





/Codeine # 3 tab]    


 


Ondansetron [Zofran Odt] 4 mg PO Q4HR PRN #20 tab.rapdis 01/28/20 Unknown Rx


 


traMADoL [Ultram] 50 mg PO Q6HR PRN #20 tablet 01/28/20 Unknown Rx


 


HYDROcodone/APAP 5-325 [[place] 1 each PO Q6HR PRN #12 tablet 03/05/20 Unknown Rx





5-325 mg TAB]    











                                    Allergies











Allergy/AdvReac Type Severity Reaction Status Date / Time


 


azithromycin [From Zithromax] Allergy  Hives Verified 03/05/20 16:50














ED Review of Systems


ROS: 


Stated complaint: KNEE PAIN


Other details as noted in HPI





Constitutional: denies: chills, fever


Eyes: denies: eye pain, eye discharge, vision change


ENT: denies: ear pain, throat pain


Respiratory: denies: cough, shortness of breath, wheezing


Cardiovascular: denies: chest pain, palpitations


Endocrine: no symptoms reported


Gastrointestinal: as per HPI


Genitourinary: as per HPI


Musculoskeletal: other (right knee pain)


Skin: denies: rash, lesions


Neurological: denies: headache, weakness, paresthesias


Psychiatric: denies: anxiety, depression


Hematological/Lymphatic: denies: easy bleeding, easy bruising





ED Past Medical Hx





- Past Medical History


Hx Hypertension: No


Hx GERD: Yes


Hx Renal Disease: No


Hx Asthma: No


Hx Tuberculosis: No


Additional medical history: A. Fib.CHF. DEPENENT EDEMA





- Surgical History


Hx Coronary Stent: No


Hx Open Heart Surgery: No


Hx Pacemaker: No


Hx Internal Defibrillator: No


Hx Cholecystectomy: Yes


Hx Appendectomy: No


Hx Breast Surgery: No


Additional Surgical History: Uterine ablation for DUB November 2014





- Social History


Smoking Status: Never Smoker


Substance Use Type: Alcohol





- Medications


Home Medications: 


                                Home Medications











 Medication  Instructions  Recorded  Confirmed  Last Taken  Type


 


Albuterol INH(or & Nicu Only) 2 puff IH QID PRN #1 inhalation 03/20/16  Unknown 

Rx





[Proair]     


 


Furosemide [Lasix] 20 mg PO QDAY #10 tablet 03/20/16  Unknown Rx


 


Metoprolol [Lopressor TAB] 50 mg PO Q12H 30 Days #60 tablet 02/20/18  Unknown Rx


 


Ibuprofen [Motrin 800 MG tab] 800 mg PO Q8HR PRN #12 tablet 03/12/18  Unknown Rx


 


Penicillin V Potassium 500 mg PO Q8H 10 Days #30 tablet 03/12/18  Unknown Rx


 


Acetaminophen/Codeine [Tylenol 1 tab PO Q6H PRN #15 tab 01/28/20  Unknown Rx





/Codeine # 3 tab]     


 


Ondansetron [Zofran Odt] 4 mg PO Q4HR PRN #20 tab.rapdis 01/28/20  Unknown Rx


 


traMADoL [Ultram] 50 mg PO Q6HR PRN #20 tablet 01/28/20  Unknown Rx


 


HYDROcodone/APAP 5-325 [Falls Of Rough 1 each PO Q6HR PRN #12 tablet 03/05/20  Unknown Rx





5-325 mg TAB]     














ED Physical Exam





- General


Limitations: Physical Limitation


General appearance: alert, in no apparent distress





- Head


Head exam: Present: atraumatic, normocephalic





- Eye


Eye exam: Present: normal appearance, PERRL, EOMI


Pupils: Present: normal accommodation





- ENT


ENT exam: Present: mucous membranes moist





- Neck


Neck exam: Present: normal inspection





- Respiratory


Respiratory exam: Present: normal lung sounds bilaterally.  Absent: respiratory 

distress





- Cardiovascular


Cardiovascular Exam: Present: regular rate, normal rhythm, normal heart sounds. 

Absent: systolic murmur, diastolic murmur, rubs, gallop





- GI/Abdominal


GI/Abdominal exam: Present: soft.  Absent: distended





- Rectal


Rectal exam: Present: deferred





- Extremities Exam


Extremities exam: Present: full ROM, tenderness (right anterior knee ).  Absent:

joint swelling





- Expanded Lower Extremity Exam


  ** Right


Knee exam: Present: full ROM, tenderness, pain w/ pronation/supination, full 

knee extension.  Absent: swelling, abrasion, laceration, ecchymosis, deformity, 

crepidus, dislocation, erythema, effusion, posterior draw sign


Lower Leg exam: Absent: tenderness, swelling


Neuro vascular tendon exam: Absent: motor deficit


Gait: Positive: observed and limited by pain





- Back Exam


Back exam: Present: normal inspection, full ROM.  Absent: tenderness, vertebral 

tenderness





- Neurological Exam


Neurological exam: Present: alert, oriented X3, CN II-XII intact, reflexes 

normal.  Absent: motor sensory deficit





- Expanded Neurological Exam


  ** Expanded


Patient oriented to: Present: person, place, time


Motor strength exam: RUE: 5, LUE: 5, RLE: 5, LLE: 5


Best Eye Response ([place]): (4) open spontaneously


Best Motor Response ([place]): (6) obeys commands


Best Verbal Response ([place]): (5) oriented


[place] Total: 15





- Psychiatric


Psychiatric exam: Present: normal affect, normal mood





- Skin


Skin exam: Present: warm, dry, intact, normal color.  Absent: rash





ED Course


                                   Vital Signs











  03/05/20





  17:32


 


Temperature 98.0 F


 


Pulse Rate 80


 


Respiratory 20





Rate 


 


Blood Pressure 119/88


 


O2 Sat by Pulse 97





Oximetry 














ED Lower Extremity MDM





- Medical Decision Making


 splint check complete pt demonstrated safe use of crutches, will be dc'd to 

home in stable condition at this time. will follow up with orthopedics in next 

1-2 days.





Critical care attestation.: 


If time is entered above; I have spent that time in minutes in the direct care 

of this critically ill patient, excluding procedure time.








ED Disposition


Clinical Impression: 


Internal derangement of knee


Qualifiers:


 Laterality: right Qualified Code(s): M23.91 - Unspecified internal derangement 

of right knee





Disposition: DC-01 TO HOME OR SELFCARE


Is pt being admited?: No


Does the pt Need Aspirin: No


Condition: Stable


Instructions:  Leg Fracture (ED)


Prescriptions: 


HYDROcodone/APAP 5-325 [Norco 5-325 mg TAB] 1 each PO Q6HR PRN #12 tablet


 PRN Reason: Pain


Referrals: 


FLORENCE REDD MD [Staff Physician] - 3-5 Days


Forms:  Work/School Release Form(ED)


Time of Disposition: 22:11

## 2020-03-05 NOTE — EMERGENCY DEPARTMENT REPORT
Blank Doc





- Documentation


Documentation: 





45-year-old female that presents with right knee knee.  Had xray and was diagn

osed with right knee fracture.  Xrays present in triage today.





This initial assessment/diagnostic orders/clinical plan/treatment(s) is/are 

subject to change based on patient's health status, clinical progression and re-

assessment by fellow clinical providers in the ED.  Further treatment and workup

at subsequent clinical providers discretion.  Patient/guardians urged not to 

elope from the ED as their condition may be serious if not clinically assessed 

and managed.  Initial orders include:


1- Patient sent to [facility] for further evaluation and treatment


2- knee immobilizer vs. splint need

## 2020-09-09 ENCOUNTER — HOSPITAL ENCOUNTER (EMERGENCY)
Dept: HOSPITAL 5 - ED | Age: 45
Discharge: LEFT BEFORE DISCHARGE | End: 2020-09-09
Payer: MEDICAID

## 2020-09-09 VITALS — SYSTOLIC BLOOD PRESSURE: 168 MMHG | DIASTOLIC BLOOD PRESSURE: 87 MMHG

## 2020-09-09 DIAGNOSIS — Z53.21: ICD-10-CM

## 2020-09-09 DIAGNOSIS — R00.2: ICD-10-CM

## 2020-09-09 DIAGNOSIS — R06.02: Primary | ICD-10-CM

## 2020-09-09 PROCEDURE — 93005 ELECTROCARDIOGRAM TRACING: CPT

## 2020-12-05 ENCOUNTER — HOSPITAL ENCOUNTER (EMERGENCY)
Dept: HOSPITAL 5 - ED | Age: 45
Discharge: HOME | End: 2020-12-05
Payer: MEDICAID

## 2020-12-05 VITALS — SYSTOLIC BLOOD PRESSURE: 155 MMHG | DIASTOLIC BLOOD PRESSURE: 91 MMHG

## 2020-12-05 DIAGNOSIS — M79.662: ICD-10-CM

## 2020-12-05 DIAGNOSIS — Z88.1: ICD-10-CM

## 2020-12-05 DIAGNOSIS — Z98.890: ICD-10-CM

## 2020-12-05 DIAGNOSIS — E87.6: Primary | ICD-10-CM

## 2020-12-05 DIAGNOSIS — M79.661: ICD-10-CM

## 2020-12-05 DIAGNOSIS — Z90.49: ICD-10-CM

## 2020-12-05 DIAGNOSIS — Z79.899: ICD-10-CM

## 2020-12-05 DIAGNOSIS — Z79.1: ICD-10-CM

## 2020-12-05 DIAGNOSIS — K21.9: ICD-10-CM

## 2020-12-05 DIAGNOSIS — G89.29: ICD-10-CM

## 2020-12-05 LAB
BUN SERPL-MCNC: 16 MG/DL (ref 7–17)
BUN/CREAT SERPL: 20 %
CALCIUM SERPL-MCNC: 9.6 MG/DL (ref 8.4–10.2)
HCT VFR BLD CALC: 38.4 % (ref 30.3–42.9)
HEMOLYSIS INDEX: 2
HGB BLD-MCNC: 12.9 GM/DL (ref 10.1–14.3)
MCHC RBC AUTO-ENTMCNC: 34 % (ref 30–34)
MCV RBC AUTO: 94 FL (ref 79–97)
PLATELET # BLD: 230 K/MM3 (ref 140–440)
RBC # BLD AUTO: 4.09 M/MM3 (ref 3.65–5.03)

## 2020-12-05 PROCEDURE — 80048 BASIC METABOLIC PNL TOTAL CA: CPT

## 2020-12-05 PROCEDURE — 36415 COLL VENOUS BLD VENIPUNCTURE: CPT

## 2020-12-05 PROCEDURE — 71046 X-RAY EXAM CHEST 2 VIEWS: CPT

## 2020-12-05 PROCEDURE — 85027 COMPLETE CBC AUTOMATED: CPT

## 2020-12-05 PROCEDURE — 83880 ASSAY OF NATRIURETIC PEPTIDE: CPT

## 2020-12-05 NOTE — XRAY REPORT
CHEST 2 VIEWS 



INDICATION / CLINICAL INFORMATION:

HX OF CHF  NOW WITH SHANTI LEG SWELLING.



COMPARISON: 

Chest x-ray 10/26/2019



FINDINGS:



SUPPORT DEVICES: None.

HEART / MEDIASTINUM: No significant abnormality. 

LUNGS / PLEURA: No significant pulmonary or pleural abnormality. No pneumothorax. 



ADDITIONAL FINDINGS: No significant additional findings.



IMPRESSION:

1. No acute findings.



Signer Name: Aguilar Banks MD 

Signed: 12/5/2020 10:55 AM

Workstation Name: [id number]

## 2020-12-05 NOTE — EMERGENCY DEPARTMENT REPORT
ED General Adult HPI





- General


Chief complaint: Extremity Injury, Lower


Stated complaint: LOWER EXTREMITY PAIN


Time Seen by Provider: 12/05/20 09:54


Source: patient


Mode of arrival: Ambulatory


Limitations: No Limitations





- History of Present Illness


Initial comments: 





This is a 45-year-old female morbidly obese she presents to the emergency room 

complaining of bilateral lower leg swelling and pain for more than a week .  

She's had a history of similar complaints for 3 years.  She saw her PCP 4 days 

ago and was started her on Lasix and potassium.  She is under the care of 

cardiologist Dr. Evangelista from [facility].  Patient states that in January 2020

she had a fall in which resulted in a torn right ACL.   She is under the care of

 orthopedist who treats her right ACL tear.  Her past medical history 

consists of CHF and hypertension .she denies chest pain fever coughing.  She 

does have episodes of shortness of breath especially with exertion. She denies 

any recent falls or injury no long distance traveling no surgical procedures.  

Patient states her symptoms have not improved since starting the Lasix .  She is

in no acute distress.  Respirations are easy and unlabored


Location: lower extremity


Severity scale (0 -10): 6


Quality: aching


Consistency: constant


Improves with: rest


Worsens with: movement, other (Ambulation)


Associated Symptoms: denies: confusion, chest pain, cough, headaches, loss of 

appetite, malaise, nausea/vomiting, rash, seizure, syncope, weakness


Treatments Prior to Arrival: none





- Related Data


                                  Previous Rx's











 Medication  Instructions  Recorded  Last Taken  Type


 


Albuterol Mdi (or & Nicu Only) 2 puff IH QID PRN #1 inhalation 03/20/16 Unknown 

Rx





[Proair]    


 


Furosemide [Lasix] 20 mg PO QDAY #10 tablet 03/20/16 Unknown Rx


 


Metoprolol [Lopressor TAB] 50 mg PO Q12H 30 Days #60 tablet 02/20/18 Unknown Rx


 


Ibuprofen [Motrin 800 MG tab] 800 mg PO Q8HR PRN #12 tablet 03/12/18 Unknown Rx


 


Penicillin V Potassium 500 mg PO Q8H 10 Days #30 tablet 03/12/18 Unknown Rx


 


Acetaminophen/Codeine [Tylenol 1 tab PO Q6H PRN #15 tab 01/28/20 Unknown Rx





/Codeine # 3 tab]    


 


Ondansetron [Zofran Odt] 4 mg PO Q4HR PRN #20 tab.rapdis 01/28/20 Unknown Rx


 


traMADoL [Ultram] 50 mg PO Q6HR PRN #20 tablet 01/28/20 Unknown Rx


 


HYDROcodone/APAP 5-325 [[place] 1 each PO Q6HR PRN #12 tablet 03/05/20 Unknown Rx





5-325 mg TAB]    


 


traMADoL [Ultram 50 MG tab] 50 mg PO Q6HR PRN #15 tablet 12/05/20 Unknown Rx











                                    Allergies











Allergy/AdvReac Type Severity Reaction Status Date / Time


 


azithromycin [From Zithromax] Allergy  Hives Verified 03/05/20 16:50














ED Review of Systems


ROS: 


Stated complaint: LOWER EXTREMITY PAIN


Other details as noted in HPI





Comment: All other systems reviewed and negative


Constitutional: no symptoms reported.  denies: chills, fever, malaise


ENT: denies: ear pain, throat pain, dental pain, hearing loss, epistaxis


Respiratory: SOB with exertion.  denies: cough, orthopnea, shortness of breath, 

SOB at rest, wheezing


Cardiovascular: edema (Lower extremities).  denies: chest pain, palpitations, 

dyspnea on exertion, orthopnea, syncope, paroxysmal nocturnal dyspnea


Endocrine: no symptoms reported.  denies: excessive sweating, flushing


Gastrointestinal: denies: abdominal pain, nausea, vomiting, diarrhea, 

constipation, hematemesis, hematochezia


Genitourinary: denies: urgency, dysuria, frequency, hematuria, discharge, 

abnormal menses, dyspareunia


Skin: denies: rash, lesions, change in color, change in hair/nails, pruritus


Neurological: denies: headache, numbness, paresthesias, confusion, abnormal 

gait, vertigo


Psychiatric: denies: anxiety, depression, auditory hallucinations, homicidal 

thoughts





ED Past Medical Hx





- Past Medical History


Previous Medical History?: Yes


Hx Hypertension: No


Hx GERD: Yes


Hx Renal Disease: No


Hx Asthma: No


Hx Tuberculosis: No


Additional medical history: A. Fib.CHF. DEPENENT EDEMA





- Surgical History


Past Surgical History?: Yes


Hx Coronary Stent: No


Hx Open Heart Surgery: No


Hx Pacemaker: No


Hx Internal Defibrillator: No


Hx Cholecystectomy: Yes


Hx Appendectomy: No


Hx Breast Surgery: No


Additional Surgical History: Uterine ablation for DUB November 2014





- Social History


Smoking Status: Never Smoker


Substance Use Type: Alcohol





- Medications


Home Medications: 


                                Home Medications











 Medication  Instructions  Recorded  Confirmed  Last Taken  Type


 


Albuterol Mdi (or & Nicu Only) 2 puff IH QID PRN #1 inhalation 03/20/16  Unknown

Rx





[Proair]     


 


Furosemide [Lasix] 20 mg PO QDAY #10 tablet 03/20/16  Unknown Rx


 


Metoprolol [Lopressor TAB] 50 mg PO Q12H 30 Days #60 tablet 02/20/18  Unknown Rx


 


Ibuprofen [Motrin 800 MG tab] 800 mg PO Q8HR PRN #12 tablet 03/12/18  Unknown Rx


 


Penicillin V Potassium 500 mg PO Q8H 10 Days #30 tablet 03/12/18  Unknown Rx


 


Acetaminophen/Codeine [Tylenol 1 tab PO Q6H PRN #15 tab 01/28/20  Unknown Rx





/Codeine # 3 tab]     


 


Ondansetron [Zofran Odt] 4 mg PO Q4HR PRN #20 tab.rapdis 01/28/20  Unknown Rx


 


traMADoL [Ultram] 50 mg PO Q6HR PRN #20 tablet 01/28/20  Unknown Rx


 


HYDROcodone/APAP 5-325 [[place] 1 each PO Q6HR PRN #12 tablet 03/05/20  Unknown Rx





5-325 mg TAB]     


 


traMADoL [Ultram 50 MG tab] 50 mg PO Q6HR PRN #15 tablet 12/05/20  Unknown Rx














ED Physical Exam





- General


Limitations: No Limitations


General appearance: alert, in no apparent distress, obese, other (Large 

pendulous breasts )





- Head


Head exam: Present: atraumatic, normal inspection





- Eye


Eye exam: Present: normal appearance.  Absent: conjunctival injection





- ENT


ENT exam: Present: normal exam, mucous membranes moist





- Neck


Neck exam: Present: normal inspection.  Absent: lymphadenopathy, thyromegaly





- Respiratory


Respiratory exam: Present: normal lung sounds bilaterally.  Absent: respiratory 

distress, wheezes, rales, rhonchi, chest wall tenderness, accessory muscle use, 

decreased breath sounds, prolonged expiratory





- Cardiovascular


Cardiovascular Exam: Present: regular rate, normal rhythm, normal heart sounds





- GI/Abdominal


GI/Abdominal exam: Present: soft, normal bowel sounds.  Absent: distended





- Extremities Exam


Extremities exam: Present: normal inspection, full ROM, normal capillary refill.

 Absent: tenderness, pedal edema, joint swelling, calf tenderness





- Back Exam


Back exam: Present: normal inspection





- Neurological Exam


Neurological exam: Present: alert, oriented X3





- Psychiatric


Psychiatric exam: Present: normal affect





- Skin


Skin exam: Present: warm, dry, intact, normal color





ED Course


                                   Vital Signs











  12/05/20





  09:11


 


Temperature 97.8 F


 


Pulse Rate 79


 


Respiratory 20





Rate 


 


Blood Pressure 155/91





[Right] 


 


O2 Sat by Pulse 99





Oximetry 














- Reevaluation(s)


Reevaluation #1: 





12/05/20 12:02


Patient in no acute distress all findings discussed with patient 








ED Medical Decision Making





- Lab Data


Result diagrams: 


                                 12/05/20 10:32





                                 12/05/20 10:32





- Radiology Data


Radiology results: report reviewed





- Medical Decision Making


This is a 45-year-old female morbidly obese with chronic lower leg pain and 

swelling for 3 years complicated by a history of torn right ACL.  She also has a

 past medical history of hypertension and CHF she is under the care of her 

cardiologist Dr. Evangelista and also an orthopedist Dr. Evangelista patient was recently 

seen by her PCP 1 week ago in which Lasix was added to her medication regimen 

along with potassium.  She reports compliance with medication regimen.  She came

 to the emergency room today because of ongoing leg bilateral leg pain and 

edema.  And periodic periodically she gets short of breath she denies chest pain

 no fever no cough.  On examination I found no swelling to her lower extremities

 her distal pulses are intact good capillary refill.  Her lungs are clear.  

Chest x-ray showed clear with no acute findings her labs are significant for 

just hypokalemia at potassium of 3.4 she was given potassium 20 p.o. her BNP was

 within normal limits and her glucose was slightly elevated at 112mg/dl.  Based 

on the findings today there is no acute emergency and patient is safe to 

discharge home with follow-up with her primary care doctor cardiologist and/or 

orthopedist.  I discussed all findings and the plan of care with patient and she

 agrees.





- Differential Diagnosis


Exacerbation of CHF peripheral edema hypokalemia


Critical Care Time: No


Critical care attestation.: 


If time is entered above; I have spent that time in minutes in the direct care 

of this critically ill patient, excluding procedure time.








ED Disposition


Clinical Impression: 


 Chronic pain of lower extremity, bilateral, Hypokalemia





Disposition: DC-01 TO HOME OR SELFCARE


Is pt being admited?: No


Does the pt Need Aspirin: No


Condition: Stable


Instructions:  Hypokalemia, How to Use Cold Therapy, Leg Cramps


Additional Instructions: 


Your chest x-ray today had no acute findings your blood sugar level was 117mg/dl

 just slightly elevated.


 and your potassium level was 3.4 decreased.  the beta natruretic peptide was 

112 which is in normal limits


Please follow-up with your primary care doctor in 3 to 5 days take your home 

medications as previously prescribed.  Return to the emergency room if you d

evelop chest pain severe shortness of breath worsening pain or swelling of your 

legs


Prescriptions: 


traMADoL [Ultram 50 MG tab] 50 mg PO Q6HR PRN #15 tablet


 PRN Reason: Pain


Referrals: 


SADAF GREY MD [Primary Care Provider] - 3-5 Days


Time of Disposition: 11:58

## 2021-08-05 ENCOUNTER — HOSPITAL ENCOUNTER (EMERGENCY)
Dept: HOSPITAL 5 - ED | Age: 46
LOS: 1 days | Discharge: LEFT BEFORE DISCHARGE | End: 2021-08-06
Payer: MEDICAID

## 2021-08-05 VITALS — DIASTOLIC BLOOD PRESSURE: 74 MMHG | SYSTOLIC BLOOD PRESSURE: 133 MMHG

## 2021-08-05 DIAGNOSIS — Z53.21: ICD-10-CM

## 2021-08-05 DIAGNOSIS — R52: Primary | ICD-10-CM

## 2021-08-05 PROCEDURE — 85025 COMPLETE CBC W/AUTO DIFF WBC: CPT

## 2021-08-05 PROCEDURE — 36415 COLL VENOUS BLD VENIPUNCTURE: CPT

## 2021-08-05 PROCEDURE — 80048 BASIC METABOLIC PNL TOTAL CA: CPT

## 2021-08-06 LAB
BASOPHILS # (AUTO): 0 K/MM3 (ref 0–0.1)
BASOPHILS NFR BLD AUTO: 0.2 % (ref 0–1.8)
BUN SERPL-MCNC: 15 MG/DL (ref 7–17)
BUN/CREAT SERPL: 17 %
CALCIUM SERPL-MCNC: 9.6 MG/DL (ref 8.4–10.2)
EOSINOPHIL # BLD AUTO: 0.2 K/MM3 (ref 0–0.4)
EOSINOPHIL NFR BLD AUTO: 2.2 % (ref 0–4.3)
HCT VFR BLD CALC: 38 % (ref 30.3–42.9)
HEMOLYSIS INDEX: 0
HGB BLD-MCNC: 13.3 GM/DL (ref 10.1–14.3)
LYMPHOCYTES # BLD AUTO: 3.2 K/MM3 (ref 1.2–5.4)
LYMPHOCYTES NFR BLD AUTO: 31.3 % (ref 13.4–35)
MCHC RBC AUTO-ENTMCNC: 35 % (ref 30–34)
MCV RBC AUTO: 91 FL (ref 79–97)
MONOCYTES # (AUTO): 1.3 K/MM3 (ref 0–0.8)
MONOCYTES % (AUTO): 12.3 % (ref 0–7.3)
PLATELET # BLD: 219 K/MM3 (ref 140–440)
RBC # BLD AUTO: 4.17 M/MM3 (ref 3.65–5.03)

## 2022-03-15 ENCOUNTER — RX ONLY (OUTPATIENT)
Age: 47
Setting detail: RX ONLY
End: 2022-03-15

## 2022-12-09 ENCOUNTER — OFFICE VISIT (OUTPATIENT)
Dept: URBAN - METROPOLITAN AREA CLINIC 118 | Facility: CLINIC | Age: 47
End: 2022-12-09
Payer: COMMERCIAL

## 2022-12-09 ENCOUNTER — LAB OUTSIDE AN ENCOUNTER (OUTPATIENT)
Dept: URBAN - METROPOLITAN AREA CLINIC 118 | Facility: CLINIC | Age: 47
End: 2022-12-09

## 2022-12-09 ENCOUNTER — DASHBOARD ENCOUNTERS (OUTPATIENT)
Age: 47
End: 2022-12-09

## 2022-12-09 VITALS
DIASTOLIC BLOOD PRESSURE: 83 MMHG | HEART RATE: 88 BPM | WEIGHT: 293 LBS | HEIGHT: 65 IN | BODY MASS INDEX: 48.82 KG/M2 | SYSTOLIC BLOOD PRESSURE: 127 MMHG | TEMPERATURE: 97.7 F

## 2022-12-09 DIAGNOSIS — Z12.11 SCREEN FOR COLON CANCER: ICD-10-CM

## 2022-12-09 DIAGNOSIS — K62.5 RECTAL BLEEDING: ICD-10-CM

## 2022-12-09 PROCEDURE — 99204 OFFICE O/P NEW MOD 45 MIN: CPT | Performed by: INTERNAL MEDICINE

## 2022-12-09 RX ORDER — BACLOFEN 10 MG/1
TABLET ORAL
Qty: 60 TABLET | Status: ACTIVE | COMMUNITY

## 2022-12-09 RX ORDER — POTASSIUM CHLORIDE 1500 MG/1
TABLET, EXTENDED RELEASE ORAL
Qty: 90 TABLET | Status: ACTIVE | COMMUNITY

## 2022-12-09 RX ORDER — TIZANIDINE 4 MG/1
TABLET ORAL
Qty: 60 TABLET | Status: ACTIVE | COMMUNITY

## 2022-12-09 RX ORDER — ATORVASTATIN CALCIUM 40 MG/1
TABLET, FILM COATED ORAL
Qty: 90 TABLET | Status: ACTIVE | COMMUNITY

## 2022-12-09 RX ORDER — CYCLOBENZAPRINE HYDROCHLORIDE 10 MG/1
TABLET, FILM COATED ORAL
Qty: 30 TABLET | Status: ACTIVE | COMMUNITY

## 2022-12-09 RX ORDER — IBUPROFEN 800 MG/1
TABLET, FILM COATED ORAL
Qty: 60 TABLET | Status: ACTIVE | COMMUNITY

## 2022-12-09 RX ORDER — METOPROLOL SUCCINATE ER TABLETS 50 MG/1
TABLET, FILM COATED, EXTENDED RELEASE ORAL
Qty: 90 TABLET | Status: ACTIVE | COMMUNITY

## 2022-12-09 RX ORDER — HYDROCODONE BITARTRATE AND ACETAMINOPHEN 10; 325 MG/1; MG/1
TABLET ORAL
Qty: 30 TABLET | Status: ACTIVE | COMMUNITY

## 2022-12-09 RX ORDER — PREGABALIN CAPSULES, CV 50 MG/1
CAPSULE ORAL
Qty: 60 CAPSULE | Status: ACTIVE | COMMUNITY

## 2022-12-09 RX ORDER — TRAMADOL HYDROCHLORIDE 50 MG/1
TABLET, COATED ORAL
Qty: 14 TABLET | Status: ACTIVE | COMMUNITY

## 2022-12-09 RX ORDER — ALBUTEROL SULFATE 90 UG/1
AEROSOL, METERED RESPIRATORY (INHALATION)
Qty: 18 GRAM | Status: ACTIVE | COMMUNITY

## 2022-12-09 RX ORDER — FUROSEMIDE 20 MG/1
TABLET ORAL
Qty: 30 TABLET | Status: ACTIVE | COMMUNITY

## 2022-12-09 RX ORDER — DICLOFENAC SODIUM 75 MG/1
TABLET, DELAYED RELEASE ORAL
Qty: 60 TABLET | Status: ACTIVE | COMMUNITY

## 2022-12-09 RX ORDER — FLUTICASONE FUROATE, UMECLIDINIUM BROMIDE AND VILANTEROL TRIFENATATE 100; 62.5; 25 UG/1; UG/1; UG/1
POWDER RESPIRATORY (INHALATION)
Qty: 60 BLISTER | Status: ACTIVE | COMMUNITY

## 2022-12-09 RX ORDER — AMLODIPINE BESYLATE 5 MG/1
TABLET ORAL
Qty: 30 TABLET | Status: ACTIVE | COMMUNITY

## 2022-12-09 NOTE — HPI-TODAY'S VISIT:
pt presents for colon cancer screening. pt reports recent rectal bleeding with bm's. Pt denies diarrhea, constipation or straining. pt reports no abdominal pain or other LGI symptoms. No weight loss or anemia. pt denies UGI symptoms including nausea, vomiting, gerd or dysphagia. pt referred fro colonoscopy.

## 2023-01-15 ENCOUNTER — RX ONLY (OUTPATIENT)
Age: 48
Setting detail: RX ONLY
End: 2023-01-15

## 2023-01-26 ENCOUNTER — TELEPHONE ENCOUNTER (OUTPATIENT)
Dept: URBAN - METROPOLITAN AREA CLINIC 118 | Facility: CLINIC | Age: 48
End: 2023-01-26

## 2023-01-27 ENCOUNTER — OFFICE VISIT (OUTPATIENT)
Dept: URBAN - METROPOLITAN AREA MEDICAL CENTER 16 | Facility: MEDICAL CENTER | Age: 48
End: 2023-01-27
Payer: COMMERCIAL

## 2023-01-27 DIAGNOSIS — K62.5 ANAL BLEEDING: ICD-10-CM

## 2023-01-27 DIAGNOSIS — K62.1 ANAL AND RECTAL POLYP: ICD-10-CM

## 2023-01-27 PROCEDURE — 45385 COLONOSCOPY W/LESION REMOVAL: CPT | Performed by: INTERNAL MEDICINE

## 2023-01-27 RX ORDER — FUROSEMIDE 20 MG/1
TABLET ORAL
Qty: 30 TABLET | Status: ACTIVE | COMMUNITY

## 2023-01-27 RX ORDER — AMLODIPINE BESYLATE 5 MG/1
TABLET ORAL
Qty: 30 TABLET | Status: ACTIVE | COMMUNITY

## 2023-01-27 RX ORDER — ALBUTEROL SULFATE 90 UG/1
AEROSOL, METERED RESPIRATORY (INHALATION)
Qty: 18 GRAM | Status: ACTIVE | COMMUNITY

## 2023-01-27 RX ORDER — TRAMADOL HYDROCHLORIDE 50 MG/1
TABLET, COATED ORAL
Qty: 14 TABLET | Status: ACTIVE | COMMUNITY

## 2023-01-27 RX ORDER — TIZANIDINE 4 MG/1
TABLET ORAL
Qty: 60 TABLET | Status: ACTIVE | COMMUNITY

## 2023-01-27 RX ORDER — METOPROLOL SUCCINATE ER TABLETS 50 MG/1
TABLET, FILM COATED, EXTENDED RELEASE ORAL
Qty: 90 TABLET | Status: ACTIVE | COMMUNITY

## 2023-01-27 RX ORDER — POTASSIUM CHLORIDE 1500 MG/1
TABLET, EXTENDED RELEASE ORAL
Qty: 90 TABLET | Status: ACTIVE | COMMUNITY

## 2023-01-27 RX ORDER — BACLOFEN 10 MG/1
TABLET ORAL
Qty: 60 TABLET | Status: ACTIVE | COMMUNITY

## 2023-01-27 RX ORDER — IBUPROFEN 800 MG/1
TABLET, FILM COATED ORAL
Qty: 60 TABLET | Status: ACTIVE | COMMUNITY

## 2023-01-27 RX ORDER — ATORVASTATIN CALCIUM 40 MG/1
TABLET, FILM COATED ORAL
Qty: 90 TABLET | Status: ACTIVE | COMMUNITY

## 2023-01-27 RX ORDER — PREGABALIN CAPSULES, CV 50 MG/1
CAPSULE ORAL
Qty: 60 CAPSULE | Status: ACTIVE | COMMUNITY

## 2023-01-27 RX ORDER — HYDROCODONE BITARTRATE AND ACETAMINOPHEN 10; 325 MG/1; MG/1
TABLET ORAL
Qty: 30 TABLET | Status: ACTIVE | COMMUNITY

## 2023-01-27 RX ORDER — CYCLOBENZAPRINE HYDROCHLORIDE 10 MG/1
TABLET, FILM COATED ORAL
Qty: 30 TABLET | Status: ACTIVE | COMMUNITY

## 2023-01-27 RX ORDER — DICLOFENAC SODIUM 75 MG/1
TABLET, DELAYED RELEASE ORAL
Qty: 60 TABLET | Status: ACTIVE | COMMUNITY

## 2023-01-27 RX ORDER — FLUTICASONE FUROATE, UMECLIDINIUM BROMIDE AND VILANTEROL TRIFENATATE 100; 62.5; 25 UG/1; UG/1; UG/1
POWDER RESPIRATORY (INHALATION)
Qty: 60 BLISTER | Status: ACTIVE | COMMUNITY

## 2023-03-26 ENCOUNTER — RX ONLY (OUTPATIENT)
Age: 48
Setting detail: RX ONLY
End: 2023-03-26

## 2023-05-15 ENCOUNTER — RX ONLY (OUTPATIENT)
Age: 48
Setting detail: RX ONLY
End: 2023-05-15

## 2023-05-16 ENCOUNTER — APPOINTMENT (RX ONLY)
Dept: URBAN - METROPOLITAN AREA CLINIC 50 | Facility: CLINIC | Age: 48
Setting detail: DERMATOLOGY
End: 2023-05-16

## 2023-05-16 DIAGNOSIS — L66.8 OTHER CICATRICIAL ALOPECIA: ICD-10-CM

## 2023-05-16 DIAGNOSIS — L21.8 OTHER SEBORRHEIC DERMATITIS: ICD-10-CM

## 2023-05-16 DIAGNOSIS — L66.81 CENTRAL CENTRIFUGAL CICATRICIAL ALOPECIA: ICD-10-CM

## 2023-05-16 PROCEDURE — 99204 OFFICE O/P NEW MOD 45 MIN: CPT

## 2023-05-16 PROCEDURE — ? TREATMENT REGIMEN

## 2023-05-16 PROCEDURE — ? COUNSELING

## 2023-05-16 PROCEDURE — ? PRESCRIPTION

## 2023-05-16 RX ORDER — CLOBETASOL PROPIONATE 0.5 MG/ML
SOLUTION TOPICAL
Qty: 50 | Refills: 2 | Status: ERX | COMMUNITY
Start: 2023-05-16

## 2023-05-16 RX ORDER — KETOCONAZOLE 20 MG/ML
SHAMPOO, SUSPENSION TOPICAL WEEKLY
Qty: 120 | Refills: 2 | Status: ERX | COMMUNITY
Start: 2023-05-16

## 2023-05-16 RX ADMIN — KETOCONAZOLE: 20 SHAMPOO, SUSPENSION TOPICAL at 00:00

## 2023-05-16 RX ADMIN — CLOBETASOL PROPIONATE: 0.5 SOLUTION TOPICAL at 00:00

## 2023-05-16 ASSESSMENT — LOCATION SIMPLE DESCRIPTION DERM
LOCATION SIMPLE: LEFT SCALP
LOCATION SIMPLE: SCALP

## 2023-05-16 ASSESSMENT — LOCATION DETAILED DESCRIPTION DERM
LOCATION DETAILED: LEFT MEDIAL FRONTAL SCALP
LOCATION DETAILED: LEFT SUPERIOR PARIETAL SCALP

## 2023-05-16 ASSESSMENT — LOCATION ZONE DERM: LOCATION ZONE: SCALP

## 2023-05-16 NOTE — HPI: ITCHING
How Did Your Itching Occur?: gradual in onset  (over a period of years)
How Severe Is Your Itching?: mild
Additional History: Pt states that she was given a steroid and antibiotics by urgent care doctor. Pt reports that the medications helped a little.

## 2023-05-16 NOTE — PROCEDURE: COUNSELING
Detail Level: Zone
Patient Specific Counseling (Will Not Stick From Patient To Patient): *Discussed clobetasol will help some, but will need to treat at follow-up. Her hair loss began 10 years ago.

## 2023-05-16 NOTE — PROCEDURE: TREATMENT REGIMEN
Initiate Treatment: KETOCONAZOLE 2 % shampoo Weekly\\nSig: Wet scalp apply small amount, lather, let sit 5-10 mins then rinse, may follow with regular shampoo and conditioner once a week.\\n\\nCLOBETASOL 0.05 % scalp solution 1-2 x daily\\nSig: Apply thin layer to affected areas of scalp twice a day for 2 weeks ( for every 2 weeks medication is used must stop for 1 week)
Detail Level: Zone
Initiate Treatment: CLOBEASOL 0.05 % scalp solution 1-2 x daily\\nSig: Apply thin layer to affected areas of scalp twice a day for 2 weeks ( for every 2 weeks medication is used must stop for 1 week)

## 2023-05-19 ENCOUNTER — RX ONLY (OUTPATIENT)
Age: 48
Setting detail: RX ONLY
End: 2023-05-19

## 2023-05-19 RX ORDER — CLOBETASOL PROPIONATE 0.5 MG/ML
SOLUTION TOPICAL
Qty: 50 | Refills: 2 | Status: ERX

## 2023-08-22 ENCOUNTER — APPOINTMENT (RX ONLY)
Dept: URBAN - METROPOLITAN AREA CLINIC 50 | Facility: CLINIC | Age: 48
Setting detail: DERMATOLOGY
End: 2023-08-22

## 2023-08-22 ENCOUNTER — RX ONLY (OUTPATIENT)
Age: 48
Setting detail: RX ONLY
End: 2023-08-22

## 2023-08-22 DIAGNOSIS — L21.8 OTHER SEBORRHEIC DERMATITIS: ICD-10-CM

## 2023-08-22 DIAGNOSIS — L66.81 CENTRAL CENTRIFUGAL CICATRICIAL ALOPECIA: ICD-10-CM

## 2023-08-22 DIAGNOSIS — L66.8 OTHER CICATRICIAL ALOPECIA: ICD-10-CM

## 2023-08-22 PROCEDURE — 99213 OFFICE O/P EST LOW 20 MIN: CPT

## 2023-08-22 PROCEDURE — ? COUNSELING

## 2023-08-22 PROCEDURE — ? TREATMENT REGIMEN

## 2023-08-22 RX ORDER — CLOBETASOL PROPIONATE 0.5 MG/ML
SOLUTION TOPICAL
Qty: 50 | Refills: 2

## 2023-08-22 ASSESSMENT — LOCATION DETAILED DESCRIPTION DERM
LOCATION DETAILED: LEFT MEDIAL FRONTAL SCALP
LOCATION DETAILED: LEFT SUPERIOR PARIETAL SCALP

## 2023-08-22 ASSESSMENT — LOCATION SIMPLE DESCRIPTION DERM
LOCATION SIMPLE: LEFT SCALP
LOCATION SIMPLE: SCALP

## 2023-08-22 ASSESSMENT — LOCATION ZONE DERM: LOCATION ZONE: SCALP

## 2023-08-22 NOTE — PROCEDURE: TREATMENT REGIMEN
Plan: Explained to patient that BLUE MAGIC may be causing a reaction and causing her scalp to become irritated and itchy. Advised her to discontinue and replace it with Vaseline or Aquaphor
Discontinue Regimen: Scratching \\nBlue magic
Continue Regimen: KETOCONAZOLE 2 % shampoo Weekly\\nSig: Wet scalp apply small amount, lather, let sit 5-10 mins then rinse, may follow with regular shampoo and conditioner once a week.\\n\\nCLOBETASOL 0.05 % scalp solution 1-2 x daily\\nSig: Apply thin layer to affected areas of scalp twice a day for 2 weeks ( for every 2 weeks medication is used must stop for 1 week)
Detail Level: Zone
Otc Regimen: Vaseline or Aquaphor
Initiate Treatment: CLOBEASOL 0.05 % scalp solution 1-2 x daily\\nSig: Apply thin layer to affected areas of scalp twice a day for 2 weeks ( for every 2 weeks medication is used must stop for 1 week)

## 2023-10-03 ENCOUNTER — APPOINTMENT (RX ONLY)
Dept: URBAN - METROPOLITAN AREA CLINIC 50 | Facility: CLINIC | Age: 48
Setting detail: DERMATOLOGY
End: 2023-10-03

## 2023-10-03 DIAGNOSIS — L21.8 OTHER SEBORRHEIC DERMATITIS: ICD-10-CM

## 2023-10-03 DIAGNOSIS — L30.0 NUMMULAR DERMATITIS: ICD-10-CM

## 2023-10-03 DIAGNOSIS — L66.81 CENTRAL CENTRIFUGAL CICATRICIAL ALOPECIA: ICD-10-CM

## 2023-10-03 DIAGNOSIS — L66.8 OTHER CICATRICIAL ALOPECIA: ICD-10-CM

## 2023-10-03 PROCEDURE — ? COUNSELING

## 2023-10-03 PROCEDURE — 99214 OFFICE O/P EST MOD 30 MIN: CPT

## 2023-10-03 PROCEDURE — ? PRESCRIPTION

## 2023-10-03 PROCEDURE — ? PRESCRIPTION MEDICATION MANAGEMENT

## 2023-10-03 RX ORDER — CLOBETASOL PROPIONATE 0.5 MG/G
THIN LAYER OINTMENT TOPICAL BID
Qty: 60 | Refills: 1 | Status: ERX | COMMUNITY
Start: 2023-10-03

## 2023-10-03 RX ADMIN — CLOBETASOL PROPIONATE THIN LAYER: 0.5 OINTMENT TOPICAL at 00:00

## 2023-10-03 ASSESSMENT — LOCATION ZONE DERM
LOCATION ZONE: SCALP
LOCATION ZONE: TRUNK

## 2023-10-03 ASSESSMENT — LOCATION SIMPLE DESCRIPTION DERM
LOCATION SIMPLE: RIGHT LOWER BACK
LOCATION SIMPLE: SCALP
LOCATION SIMPLE: POSTERIOR SCALP

## 2023-10-03 ASSESSMENT — LOCATION DETAILED DESCRIPTION DERM
LOCATION DETAILED: RIGHT SUPERIOR LATERAL MIDBACK
LOCATION DETAILED: RIGHT SUPERIOR PARIETAL SCALP
LOCATION DETAILED: POSTERIOR MID-PARIETAL SCALP

## 2023-10-03 NOTE — PROCEDURE: PRESCRIPTION MEDICATION MANAGEMENT
Detail Level: Zone
Render In Strict Bullet Format?: No
Plan: Improved with significant new growth. Discussed due to the patient already being on many oral HTN medications, will not start the patient on oral minoxidil and will use the topical treatment instead \\n\\nContinue clobetasol scalp solution 1-2 x daily x 2 weeks, stop x 1 week, then repeat \\n\\nRTC 8 weeks to f/u
Samples Given: La Roche Posay Lipikar Cream\\nEucerin Advanced Repair Moisturizing Cream\\nCeraVe Moisturizing Cream
Plan: Discussed this condition usually occurs during seasonal changes. \\nWill prescribe a topical steroidal ointment to use twice daily x 2 weeks, stop x 1 week, then only use as needed and moisturize for preventative maintenance \\n\\nAdvised to moisturize with a thick, creamy, gentle moisturizer immediately after showering while the skin is still damp to lock in moisture \\n\\nRTC 8 weeks to f/u
Initiate Treatment: Clobetasol 0.05% ointment twice daily x 2 weeks, stop x 1 week, then use as needed
Continue Regimen: Ketoconazole 2% shampoo once weekly \\nClobetasol 0.05% scalp solution 1-2 x daily x 2 weeks, stop x 1 week, then repeat as needed for itching
Plan: Well controlled. Continue current treatment regimen\\n\\nRTC 8 weeks to f/u